# Patient Record
Sex: FEMALE
[De-identification: names, ages, dates, MRNs, and addresses within clinical notes are randomized per-mention and may not be internally consistent; named-entity substitution may affect disease eponyms.]

---

## 2019-12-23 ENCOUNTER — HOSPITAL ENCOUNTER (EMERGENCY)
Dept: HOSPITAL 43 - DL.ED | Age: 29
Discharge: HOME | End: 2019-12-23
Payer: SELF-PAY

## 2019-12-23 DIAGNOSIS — J20.9: Primary | ICD-10-CM

## 2019-12-23 PROCEDURE — 71046 X-RAY EXAM CHEST 2 VIEWS: CPT

## 2019-12-23 PROCEDURE — 87081 CULTURE SCREEN ONLY: CPT

## 2019-12-23 PROCEDURE — 99284 EMERGENCY DEPT VISIT MOD MDM: CPT

## 2019-12-23 PROCEDURE — 96372 THER/PROPH/DIAG INJ SC/IM: CPT

## 2019-12-23 PROCEDURE — 87430 STREP A AG IA: CPT

## 2019-12-23 PROCEDURE — 87804 INFLUENZA ASSAY W/OPTIC: CPT

## 2019-12-23 NOTE — EDM.PDOC
ED HPI GENERAL MEDICAL PROBLEM





- General


Chief Complaint: ENT Problem


Stated Complaint: COUGHING PAST COUPLE DAYS, HURTS RIBS


Time Seen by Provider: 19 07:02


Source of Information: Reports: Patient


History Limitations: Reports: No Limitations





- History of Present Illness


INITIAL COMMENTS - FREE TEXT/NARRATIVE: 





sick with cough past month.


Treatments PTA: Reports: Other (see below)


Other Treatments PTA: Aleve


  ** Upper Back


Pain Score (Numeric/FACES): 10





- Related Data


 Allergies











Allergy/AdvReac Type Severity Reaction Status Date / Time


 


No Known Allergies Allergy   Verified 19 06:09











Home Meds: 


 Home Meds





. [No Known Home Meds]  19 [History]











Past Medical History


HEENT History: Reports: None


Cardiovascular History: Reports: None


Respiratory History: Reports: Bronchitis, Recurrent


Gastrointestinal History: Reports: None


Genitourinary History: Reports: None


OB/GYN History: Reports: Pregnancy


Musculoskeletal History: Reports: None


Neurological History: Reports: None


Psychiatric History: Reports: None


Endocrine/Metabolic History: Reports: None


Hematologic History: Reports: None


Immunologic History: Reports: None


Oncologic (Cancer) History: Reports: None


Dermatologic History: Reports: None





- Infectious Disease History


Infectious Disease History: Reports: None





- Past Surgical History


Head Surgeries/Procedures: Reports: None


HEENT Surgical History: Reports: None


Cardiovascular Surgical History: Reports: None


Respiratory Surgical History: Reports: None


GI Surgical History: Reports: None


Female  Surgical History: Reports:  Section


Endocrine Surgical History: Reports: None


Musculoskeletal Surgical History: Reports: None


Oncologic Surgical History: Reports: None





Social & Family History





- Family History


Family Medical History: Noncontributory





- Tobacco Use


Smoking Status *Q: Never Smoker


Second Hand Smoke Exposure: No





- Caffeine Use


Caffeine Use: Reports: Soda





- Recreational Drug Use


Recreational Drug Use: No





ED ROS ENT





- Review of Systems


Review Of Systems: Comprehensive ROS is negative, except as noted in HPI.





ED EXAM, ENT





- Physical Exam


Exam: See Below


Exam Limited By: No Limitations


General Appearance: Alert, WD/WN, Mild Distress, Other (cough spasms)


Ears: Hearing Grossly Normal


Nose: Normal Inspection


Mouth/Throat: Pharyngeal Erythema


Head: Atraumatic


Neck: Non-Tender, Full Range of Motion


Respiratory/Chest: No Respiratory Distress, No Accessory Muscle Use, Rhonchi.  

No: Decreased Breath Sounds


Cardiovascular: Regular Rate, Rhythm


GI/Abdominal: Soft, Non-Tender


Neurological: Alert, Oriented, Normal Cognition, Normal Gait, No Motor/Sensory 

Deficits


Psychiatric: Normal Affect, Normal Mood


Skin: Warm, Dry, Normal Color


Lymphatic: No Adenopathy





Course





- Vital Signs


Last Recorded V/S: 





 Last Vital Signs











Temp  35.9 C   19 06:08


 


Pulse  92   19 06:08


 


Resp  18   19 06:08


 


BP  111/67   19 06:08


 


Pulse Ox  100   19 06:08














- Orders/Labs/Meds


Orders: 





 Active Orders 24 hr











 Category Date Time Status


 


 RT Aerosol Therapy [RC] ASDIRECTED Care  19 06:12 Active


 


 Chest 2V [CR] Urgent Exams  19 06:12 Taken


 


 CULTURE STREP A CONFIRMATION [RM] Stat Lab  19 06:05 Results


 


 STREP SCRN A RAPID W CULT CONF [RM] Stat Lab  19 06:05 Results


 


 Ketorolac [Toradol] Med  19 07:02 Once





 30 mg IM ONETIME ONE   











Meds: 





Medications














Discontinued Medications














Generic Name Dose Route Start Last Admin





  Trade Name Freq  PRN Reason Stop Dose Admin


 


Albuterol/Ipratropium  3 ml  19 06:11  19 06:22





  Duoneb 3.0-0.5 Mg/3 Ml  NEB  19 06:12  3 ml





  ONETIME ONE   Administration





     





     





     





     














- Re-Assessments/Exams


Free Text/Narrative Re-Assessment/Exam: 





19 07:04


results discussed with pt.





Departure





- Departure


Time of Disposition: 07:06


Disposition: Home, Self-Care 01


Condition: Good


Clinical Impression: 


 Acute bronchitis with bronchospasm








- Discharge Information


Instructions:  Acute Bronchitis, Adult, Easy-to-Read


Additional Instructions: 


1) don't sleep flat at night


2) drink lots of liquids


3) use humidifier at bedtime


4) follow up at clinic





rx given;


medrol dospak


albuterol 2.5mg solution tid prn


phenergan codeine syrup qid prn x 4oz





Sepsis Event Note





- Evaluation


Sepsis Screening Result: No Definite Risk





- Focused Exam


Vital Signs: 





 Vital Signs











  Temp Pulse Resp BP Pulse Ox


 


 19 06:08  35.9 C  92  18  111/67  100











Date Exam was Performed: 19


Time Exam was Performed: 07:02





- My Orders


Last 24 Hours: 





My Active Orders





19 06:05


CULTURE STREP A CONFIRMATION [RM] Stat 


STREP SCRN A RAPID W CULT CONF [RM] Stat 





19 06:12


RT Aerosol Therapy [RC] ASDIRECTED 


Chest 2V [CR] Urgent 





19 07:02


Ketorolac [Toradol]   30 mg IM ONETIME ONE 














- Assessment/Plan


Last 24 Hours: 





My Active Orders





19 06:05


CULTURE STREP A CONFIRMATION [RM] Stat 


STREP SCRN A RAPID W CULT CONF [RM] Stat 





19 06:12


RT Aerosol Therapy [RC] ASDIRECTED 


Chest 2V [CR] Urgent 





19 07:02


Ketorolac [Toradol]   30 mg IM ONETIME ONE

## 2020-01-20 NOTE — EDM.PDOC
<Kei Vicente - Last Filed: 20 06:38>





ED HPI GENERAL MEDICAL PROBLEM





- General


Chief Complaint: Genitourinary Problem


Stated Complaint: STRONG STOMACH PAIN


Time Seen by Provider: 20 06:38


Source of Information: Reports: Patient


History Limitations: Reports: No Limitations





- History of Present Illness


INITIAL COMMENTS - FREE TEXT/NARRATIVE: 





c/o urgency & frequency. also concerned pregnancy which requires serum


  ** Perineal Area


Pain Score (Numeric/FACES): 4





- Related Data


 Allergies











Allergy/AdvReac Type Severity Reaction Status Date / Time


 


No Known Allergies Allergy   Verified 20 06:13











Home Meds: 


 Home Meds





. [No Known Home Meds]  19 [History]











Past Medical History


HEENT History: Reports: None


Cardiovascular History: Reports: None


Respiratory History: Reports: Bronchitis, Recurrent


Gastrointestinal History: Reports: None


Genitourinary History: Reports: None


OB/GYN History: Reports: Pregnancy


Musculoskeletal History: Reports: None


Neurological History: Reports: None


Psychiatric History: Reports: None


Endocrine/Metabolic History: Reports: None


Hematologic History: Reports: None


Immunologic History: Reports: None


Oncologic (Cancer) History: Reports: None


Dermatologic History: Reports: None





- Infectious Disease History


Infectious Disease History: Reports: None





- Past Surgical History


Head Surgeries/Procedures: Reports: None


HEENT Surgical History: Reports: None


Cardiovascular Surgical History: Reports: None


Respiratory Surgical History: Reports: None


GI Surgical History: Reports: None


Female  Surgical History: Reports:  Section


Endocrine Surgical History: Reports: None


Musculoskeletal Surgical History: Reports: None


Oncologic Surgical History: Reports: None





Social & Family History





- Family History


Family Medical History: Noncontributory





- Tobacco Use


Smoking Status *Q: Never Smoker


Second Hand Smoke Exposure: No





- Caffeine Use


Caffeine Use: Reports: Coffee, Soda





- Recreational Drug Use


Recreational Drug Use: No





ED ROS GENERAL





- Review of Systems


Review Of Systems: Comprehensive ROS is negative, except as noted in HPI.





ED EXAM, RENAL/





- Physical Exam


Exam: See Below


Exam Limited By: No Limitations


General Appearance: Alert, WD/WN, No Apparent Distress


Ears: Hearing Grossly Normal


Throat/Mouth: Normal Voice, No Airway Compromise


Head: Atraumatic


Neck: Non-Tender, Full Range of Motion


Respiratory/Chest: No Respiratory Distress


Cardiovascular: Regular Rate, Rhythm


GI/Abdominal: Soft, Non-Tender


Neurological: Alert, Oriented, Normal Cognition, Normal Gait, No Motor/Sensory 

Deficits


Psychiatric: Normal Affect, Normal Mood


Skin Exam: Warm, Dry, Normal Color


Lymphatic: No Adenopathy





Course





- Vital Signs


Last Recorded V/S: 





 Last Vital Signs











Temp  36.1 C   20 06:13


 


Pulse  86   20 06:13


 


Resp  16   20 06:13


 


BP  137/93 H  20 06:13


 


Pulse Ox  100   20 06:13














- Orders/Labs/Meds


Labs: 





 Laboratory Tests











  20 Range/Units





  06:15 06:15 06:56 


 


HCG, Qual    Negative  


 


Urine Color  Light yellow    (YELLOW)  


 


Urine Appearance  Clear    (CLEAR)  


 


Urine pH  5.5    (5.0-9.0)  


 


Ur Specific Gravity  >= 1.030    (1.005-1.030)  


 


Urine Protein  Negative    (NEGATIVE)  


 


Urine Glucose (UA)  Negative    (NEGATIVE)  


 


Urine Ketones  Negative    (NEGATIVE)  


 


Urine Occult Blood  Negative    (NEGATIVE)  


 


Urine Nitrite  Negative    (NEGATIVE)  


 


Urine Bilirubin  Negative    (NEGATIVE)  


 


Urine Urobilinogen  0.2    (0.2-1.0)  mg/dL


 


Ur Leukocyte Esterase  Negative    (NEGATIVE)  


 


Urine HCG, Qual   Negative   














Departure





- Departure


Disposition: Home, Self-Care 01


Clinical Impression: 


 Negative pregnancy test, Urinary frequency








- Discharge Information


Forms:  ED Department Discharge


Care Plan Goals: 


The patient was advised of the examination and lab results during the visit. 

The patient was encouraged to increase her oral fluid intake. If the patient 

has any additional symptoms or concerns, the patient should visit her primary 

care facility or return to the emergency department. 





Sepsis Event Note





- Evaluation


Sepsis Screening Result: No Definite Risk





- Focused Exam


Vital Signs: 





 Vital Signs











  Temp Pulse Resp BP Pulse Ox


 


 20 06:13  36.1 C  86  16  137/93 H  100











Date Exam was Performed: 20


Time Exam was Performed: 06:38





<Tim Eller - Last Filed: 20 07:30>





Departure





- Departure


Time of Disposition: 07:28


Condition: Good





- Discharge Information


*PRESCRIPTION DRUG MONITORING PROGRAM REVIEWED*: Not Applicable


*COPY OF PRESCRIPTION DRUG MONITORING REPORT IN PATIENT [facility]: Not Applicable





Sepsis Event Note





- Focused Exam


Date Exam was Performed: 20


Time Exam was Performed: 07:28

## 2020-02-16 NOTE — EDM.PDOC
<DarricksharitaEvi huber Erggie - Last Filed: 20 06:08>





ED HPI GENERAL MEDICAL PROBLEM





- General


Stated Complaint: HEADACHE


Time Seen by Provider: 20 06:05


Source of Information: Reports: Patient, RN Notes Reviewed





- Related Data


 Allergies











Allergy/AdvReac Type Severity Reaction Status Date / Time


 


No Known Allergies Allergy   Verified 20 06:11











Home Meds: 


 Home Meds





. [No Known Home Meds]  19 [History]











Past Medical History


HEENT History: Reports: None


Cardiovascular History: Reports: None


Respiratory History: Reports: Bronchitis, Recurrent


Gastrointestinal History: Reports: None


Genitourinary History: Reports: None


OB/GYN History: Reports: Pregnancy


Musculoskeletal History: Reports: None


Neurological History: Reports: None


Psychiatric History: Reports: None


Endocrine/Metabolic History: Reports: None


Hematologic History: Reports: None


Immunologic History: Reports: None


Oncologic (Cancer) History: Reports: None


Dermatologic History: Reports: None





- Infectious Disease History


Infectious Disease History: Reports: None





- Past Surgical History


Head Surgeries/Procedures: Reports: None


HEENT Surgical History: Reports: None


Cardiovascular Surgical History: Reports: None


Respiratory Surgical History: Reports: None


GI Surgical History: Reports: None


Female  Surgical History: Reports:  Section


Endocrine Surgical History: Reports: None


Musculoskeletal Surgical History: Reports: None


Oncologic Surgical History: Reports: None





Social & Family History





- Family History


Family Medical History: Noncontributory





- Caffeine Use


Caffeine Use: Reports: Coffee, Soda





Course





- Vital Signs


Last Recorded V/S: 





 Last Vital Signs











Temp  97.3 F   20 07:07


 


Pulse  76   20 07:07


 


Resp  18   20 07:07


 


BP  117/73   20 07:07


 


Pulse Ox  100   20 07:07














- Orders/Labs/Meds


Labs: 





 Laboratory Tests











  20 Range/Units





  06:23 06:23 06:23 


 


HCG, Quant     (0-25)  mIU/ml


 


Beta HCG, Quant     


 


Urine Color  Yellow    (YELLOW)  


 


Urine Appearance  Clear    (CLEAR)  


 


Urine pH  7.0    (5.0-9.0)  


 


Ur Specific Gravity  1.010    (1.005-1.030)  


 


Urine Protein  Negative    (NEGATIVE)  


 


Urine Glucose (UA)  Negative    (NEGATIVE)  


 


Urine Ketones  Negative    (NEGATIVE)  


 


Urine Occult Blood  Negative    (NEGATIVE)  


 


Urine Nitrite  Negative    (NEGATIVE)  


 


Urine Bilirubin  Negative    (NEGATIVE)  


 


Urine Urobilinogen  0.2    (0.2-1.0)  mg/dL


 


Ur Leukocyte Esterase  Negative    (NEGATIVE)  


 


Urine HCG, Qual   Negative   


 


Urine Opiates Screen    Negative  (NEGATIVE)  


 


Ur Oxycodone Screen    Negative  (NEGATIVE)  


 


Urine Methadone Screen    Negative  (NEGATIVE)  


 


Ur Barbiturates Screen    Negative  (NEGATIVE)  


 


U Tricyclic Antidepress    Negative  (NEGATIVE)  


 


Ur Phencyclidine Scrn    Negative  (NEGATIVE)  


 


Ur Amphetamine Screen    Negative  (NEGATIVE)  


 


U Methamphetamines Scrn    Negative  (NEGATIVE)  


 


Urine MDMA Screen    Negative  (NEGATIVE)  


 


U Benzodiazepines Scrn    Negative  (NEGATIVE)  


 


Urine Cocaine Screen    Negative  (NEGATIVE)  


 


U Marijuana (THC) Screen    Negative  (NEGATIVE)  














  20 Range/Units





  07:00 


 


HCG, Quant  65 H  (0-25)  mIU/ml


 


Beta HCG, Quant  TNP  


 


Urine Color   (YELLOW)  


 


Urine Appearance   (CLEAR)  


 


Urine pH   (5.0-9.0)  


 


Ur Specific Gravity   (1.005-1.030)  


 


Urine Protein   (NEGATIVE)  


 


Urine Glucose (UA)   (NEGATIVE)  


 


Urine Ketones   (NEGATIVE)  


 


Urine Occult Blood   (NEGATIVE)  


 


Urine Nitrite   (NEGATIVE)  


 


Urine Bilirubin   (NEGATIVE)  


 


Urine Urobilinogen   (0.2-1.0)  mg/dL


 


Ur Leukocyte Esterase   (NEGATIVE)  


 


Urine HCG, Qual   


 


Urine Opiates Screen   (NEGATIVE)  


 


Ur Oxycodone Screen   (NEGATIVE)  


 


Urine Methadone Screen   (NEGATIVE)  


 


Ur Barbiturates Screen   (NEGATIVE)  


 


U Tricyclic Antidepress   (NEGATIVE)  


 


Ur Phencyclidine Scrn   (NEGATIVE)  


 


Ur Amphetamine Screen   (NEGATIVE)  


 


U Methamphetamines Scrn   (NEGATIVE)  


 


Urine MDMA Screen   (NEGATIVE)  


 


U Benzodiazepines Scrn   (NEGATIVE)  


 


Urine Cocaine Screen   (NEGATIVE)  


 


U Marijuana (THC) Screen   (NEGATIVE)  














Departure





- Departure


Disposition: Home, Self-Care 01


Clinical Impression: 


 Pregnancy test positive, Pregnancy confirmed by positive blood test








- Discharge Information


Instructions:  Human Chorionic Gonadotropin Test, Pregnancy Test Information


Additional Instructions: 


Follow up with PCP for prenatal care.





Sepsis Event Note





- Focused Exam


Vital Signs: 





 Vital Signs











  Temp Pulse Resp BP Pulse Ox


 


 20 07:07  97.3 F  76  18  117/73  100











Date Exam was Performed: 20


Time Exam was Performed: 06:08





<Bethany Denton - Last Filed: 20 08:09>





ED HPI GENERAL MEDICAL PROBLEM





- General


History Limitations: Reports: No Limitations





- History of Present Illness


INITIAL COMMENTS - FREE TEXT/NARRATIVE: 


29 year old female who present to the ER for confirmation of a pregnancy. 

Patient reports her LMP was 2020. She states she had five pregnancy test 

at home prior to ER visit and four of them were positive. Test were present in 

the room with this patient. She is a [id number]. She is  and in a 

monogamous relationship. She denies any N/V. Reports a headache for the past 

three days which she has been medicating with Tylenol, ibuprofen and Aleve with 

relief. She states she has been working and just finish her last night shift 

before this visit. She denies any vaginal bleeding at this time. 





  ** Frontal Headache


Pain Score (Numeric/FACES): 6





ED ROS GENERAL





- Review of Systems


Review Of Systems: Comprehensive ROS is negative, except as noted in HPI.





- Physical Exam


Exam: See Below


Exam Limited By: No Limitations


General Appearance: Alert, No Apparent Distress


Eye Exam: Bilateral Eye: Normal Inspection


Ears: Normal External Exam, Normal Canal, Hearing Grossly Normal, Normal TMs


Nose: Normal Inspection, Normal Mucosa, No Blood


Throat/Mouth: Normal Inspection, Normal Lips, Normal Teeth, Normal Gums, Normal 

Oropharynx, Normal Voice, No Airway Compromise


Head Exam: Atraumatic, Normocephalic


Neck: Normal Inspection, Supple, Non-Tender, Full Range of Motion


Respiratory/Chest: No Respiratory Distress, Lungs Clear, Normal Breath Sounds, 

No Accessory Muscle Use, Chest Non-Tender


Cardiovascular: Normal Peripheral Pulses, Regular Rate, Rhythm, No Edema, No 

Gallop, No JVD, No Murmur, No Rub


GI/Abdominal: Normal Bowel Sounds, Soft


Neuro Exam (Abbreviated): Alert, Oriented, CN II-XII Intact, Normal Cognition


Back Exam: Normal Inspection, Full Range of Motion, NT


Extremities: Normal Inspection


Psychiatric: Normal Affect, Normal Mood


Skin Exam: Warm, Dry, Intact, Normal Color, No Rash





Course





- Re-Assessments/Exams


Free Text/Narrative Re-Assessment/Exam: 


Review lab results with patient. Encouraged follow up with PCP or OBGYN for 

prenatal care.











Departure





- Departure


Time of Disposition: 07:57


Condition: Good





Sepsis Event Note





- Focused Exam


Date Exam was Performed: 20


Time Exam was Performed: 07:56

## 2020-03-08 NOTE — EDM.PDOC
ED HPI GENERAL MEDICAL PROBLEM





- General


Chief Complaint: OB/GYN Problem


Stated Complaint: NAUSEA


Time Seen by Provider: 20 10:31


Source of Information: Reports: Patient


History Limitations: Reports: No Limitations





- History of Present Illness


INITIAL COMMENTS - FREE TEXT/NARRATIVE: 





4 days h/o N/V. currently pregnant @ 6 weeks and had this with prior 

pregnancies. this is #6. saw PMD got reglan but not seem to be working well.


  ** Head


Pain Score (Numeric/FACES): 6





- Related Data


 Allergies











Allergy/AdvReac Type Severity Reaction Status Date / Time


 


No Known Allergies Allergy   Verified 20 06:11











Home Meds: 


 Home Meds





. [No Known Home Meds]  19 [History]











Past Medical History


HEENT History: Reports: None


Cardiovascular History: Reports: None


Respiratory History: Reports: Bronchitis, Recurrent


Gastrointestinal History: Reports: None


Genitourinary History: Reports: None


OB/GYN History: Reports: Pregnancy, Spontaneous 


Musculoskeletal History: Reports: None


Neurological History: Reports: None


Psychiatric History: Reports: None


Endocrine/Metabolic History: Reports: None


Hematologic History: Reports: None


Immunologic History: Reports: None


Oncologic (Cancer) History: Reports: None


Dermatologic History: Reports: None





- Infectious Disease History


Infectious Disease History: Reports: None





- Past Surgical History


Head Surgeries/Procedures: Reports: None


HEENT Surgical History: Reports: None


Cardiovascular Surgical History: Reports: None


Respiratory Surgical History: Reports: None


GI Surgical History: Reports: None


Female  Surgical History: Reports:  Section


Endocrine Surgical History: Reports: None


Musculoskeletal Surgical History: Reports: None


Oncologic Surgical History: Reports: None





Social & Family History





- Family History


Family Medical History: Noncontributory





- Tobacco Use


Smoking Status *Q: Never Smoker


Second Hand Smoke Exposure: No





- Caffeine Use


Caffeine Use: Reports: Coffee, Energy Drinks, Soda, Tea





- Recreational Drug Use


Recreational Drug Use: No





ED ROS GENERAL





- Review of Systems


Review Of Systems: Comprehensive ROS is negative, except as noted in HPI.





ED EXAM, GI/ABD





- Physical Exam


Exam: See Below


Exam Limited By: No Limitations


General Appearance: Alert, WD/WN, Mild Distress, Other (nausous).  No: Active 

Emesis


Ears: Hearing Grossly Normal


Throat/Mouth: Normal Voice, No Airway Compromise


Head: Atraumatic


Neck: Non-Tender, Full Range of Motion


Respiratory/Chest: No Respiratory Distress


Cardiovascular: Regular Rate, Rhythm


GI/Abdominal Exam: Soft, Non-Tender.  No: Distended, Guarding, Rigid, Rebound, 

Tender


Neurological: Alert, Oriented, Normal Cognition, Normal Gait, No Motor/Sensory 

Deficits


Psychiatric: Flat Affect


Skin Exam: Warm, Dry, Normal Color


Lymphatic: No Adenopathy





Course





- Vital Signs


Last Recorded V/S: 


 Last Vital Signs











Temp  36.6 C   20 09:46


 


Pulse  81   20 09:46


 


Resp  18   20 09:46


 


BP  123/77   20 09:46


 


Pulse Ox  100   20 09:46














- Orders/Labs/Meds


Labs: 


 Laboratory Tests











  20 Range/Units





  10:14 10:14 


 


WBC  8.8   (5.0-10.0)  10^3/uL


 


RBC  4.02 L   (4.2-5.4)  10^6/uL


 


Hgb  11.3 L D   (12.0-16.0)  g/dL


 


Hct  34.8 L   (37.0-47.0)  %


 


MCV  86.6   ()  fL


 


MCH  28.1   (27.0-34.0)  pg


 


MCHC  32.5 L   (33.0-35.0)  g/dL


 


Plt Count  272   (150-450)  10^3/uL


 


Neut % (Auto)  65.8   (42.2-75.2)  %


 


Lymph % (Auto)  21.9   (20.5-50.1)  %


 


Mono % (Auto)  8.9 H   (2-8)  %


 


Eos % (Auto)  3.2 H   (1.0-3.0)  %


 


Baso % (Auto)  0.2   (0.0-1.0)  %


 


Sodium   132 L  (135-145)  mmol/L


 


Potassium   3.6  (3.6-5.0)  mmol/L


 


Chloride   101  (101-111)  mmol/L


 


Carbon Dioxide   25.0  (21.0-31.0)  mmol/L


 


Anion Gap   9.6  


 


BUN   10  (7-18)  mg/dL


 


Creatinine   0.5 L  (0.6-1.3)  mg/dL


 


Est Cr Clr Drug Dosing   176.62  mL/min


 


Estimated GFR (MDRD)   > 60  


 


BUN/Creatinine Ratio   20.00  


 


Glucose   87  ()  mg/dL


 


Calcium   8.7  (8.4-10.2)  mg/dl


 


Total Bilirubin   0.3  (0.2-1.0)  mg/dL


 


AST   17  (10-42)  IU/L


 


ALT   24  (10-60)  IU/L


 


Alkaline Phosphatase   52  ()  IU/L


 


Total Protein   7.1  (6.7-8.2)  g/dl


 


Albumin   4.0  (3.2-5.5)  g/dl


 


Globulin   3.1  


 


Albumin/Globulin Ratio   1.29  











Meds: 


Medications














Discontinued Medications














Generic Name Dose Route Start Last Admin





  Trade Name Freq  PRN Reason Stop Dose Admin


 


Acetaminophen  1,000 mg  20 10:28  20 10:32





  Tylenol Extra Strength  PO  20 10:29  1,000 mg





  ONETIME ONE   Administration





     





     





     





     


 


Sodium Chloride  1,000 mls @ 999 mls/hr  20 10:08  20 10:21





  Normal Saline  IV  20 11:08  999 mls/hr





  .BOLUS ONE   Administration





     





     





     





     


 


Ondansetron HCl  4 mg  20 10:08  20 10:21





  Zofran  IVPUSH  20 10:09  4 mg





  ONETIME ONE   Administration





     





     





     





     














- Re-Assessments/Exams


Free Text/Narrative Re-Assessment/Exam: 





20 11:10


results discussed with pt who is feeling better s/p zofran. states works better 

than reglan.





Departure





- Departure


Time of Disposition: 11:12


Disposition: Home, Self-Care 01


Condition: Good


Clinical Impression: 


 Hyperemesis gravidarum








- Discharge Information


Instructions:  Eating Plan for Hyperemesis Gravidarum


Forms:  ED Department Discharge


Additional Instructions: 


1) continue fluids and rest


2) follow up at clinic


3) recheck as needed





rx given;


zofran 4mg ODT daily prn x6





Sepsis Event Note





- Evaluation


Sepsis Screening Result: No Definite Risk





- Focused Exam


Vital Signs: 


 Vital Signs











  Temp Pulse Resp BP Pulse Ox


 


 20 09:46  36.6 C  81  18  123/77  100











Date Exam was Performed: 20


Time Exam was Performed: 11:10

## 2020-07-10 NOTE — EDM.PDOC
Scribed by Lalita Courtney 07/10/20 1518 for Bethany Denton NP





ED HPI GENERAL MEDICAL PROBLEM





- General


Chief Complaint: Headache


Stated Complaint: MIGRAINE-25 WKS PREGNANT


Time Seen by Provider: 07/10/20 14:50


Source of Information: Reports: Patient, RN, RN Notes Reviewed


History Limitations: Reports: No Limitations





- History of Present Illness


INITIAL COMMENTS - FREE TEXT/NARRATIVE: 


A 29-year-old female 25 week pregnant who presented to ER with complaints of 

frontal headache x8 hours. She reports waking up with a headache, went back to 

sleep and woke up an hour later with the same headache. She would like to rule 

out pre-eclampsia. She reports a lump on the right cheek, which was due for an 

MRI 2 weeks ago but she never went to the appointment with neurology. She 

reports the headache 5/10 and has not tried anything for discomfort. No 

shortness of breath, chest pain, or palpitations. She denies any dizziness, 

photophobia, recent head traum, injury or fall. 


Onset: Today


Severity: Moderate


  ** Headache


Pain Score (Numeric/FACES): 5





- Related Data


                                    Allergies











Allergy/AdvReac Type Severity Reaction Status Date / Time


 


No Known Allergies Allergy   Verified 07/10/20 14:28











Home Meds: 


                                    Home Meds





Sertraline [Zoloft] 25 mg PO DAILY 07/10/20 [History]











Past Medical History


HEENT History: Reports: None


Cardiovascular History: Reports: None


Respiratory History: Reports: Bronchitis, Recurrent


Gastrointestinal History: Reports: None


Genitourinary History: Reports: None


OB/GYN History: Reports: Pregnancy, Spontaneous 


Musculoskeletal History: Reports: None


Neurological History: Reports: None


Psychiatric History: Reports: Depression


Endocrine/Metabolic History: Reports: None


Hematologic History: Reports: None


Immunologic History: Reports: None


Oncologic (Cancer) History: Reports: None


Dermatologic History: Reports: None





- Infectious Disease History


Infectious Disease History: Reports: None





- Past Surgical History


Head Surgeries/Procedures: Reports: None


HEENT Surgical History: Reports: None


Cardiovascular Surgical History: Reports: None


Respiratory Surgical History: Reports: None


GI Surgical History: Reports: None


Female  Surgical History: Reports:  Section


Endocrine Surgical History: Reports: None


Musculoskeletal Surgical History: Reports: None


Oncologic Surgical History: Reports: None





Social & Family History





- Family History


Family Medical History: Noncontributory





- Tobacco Use


Smoking Status *Q: Never Smoker





- Caffeine Use


Caffeine Use: Reports: None





- Recreational Drug Use


Recreational Drug Use: No





ED ROS GENERAL





- Review of Systems


Review Of Systems: Comprehensive ROS is negative, except as noted in HPI.





- Physical Exam


Exam: See Below


Exam Limited By: No Limitations


General Appearance: Anxious


Eye Exam: Bilateral Eye: EOMI, Normal Inspection, PERRL


Ears: Normal External Exam, Normal Canal, Hearing Grossly Normal, Normal TMs


Nose: Normal Inspection, Normal Mucosa, No Blood


Throat/Mouth: Normal Inspection, Normal Lips, Normal Teeth, Normal Gums, Normal 

Oropharynx, Normal Voice, No Airway Compromise


Head Exam: Atraumatic, Normocephalic


Neck: Normal Inspection, Supple, Non-Tender, Full Range of Motion


Respiratory/Chest: No Respiratory Distress, Lungs Clear, Normal Breath Sounds, 

No Accessory Muscle Use, Chest Non-Tender


Cardiovascular: Normal Peripheral Pulses, Regular Rate, Rhythm, No Edema, No 

Gallop, No JVD, No Murmur, No Rub


 (Female) Exam: Deferred


Rectal (Female) Exam: Deferred


Neuro Exam (Abbreviated): Alert, Oriented, CN II-XII Intact, Normal Cognition, 

Normal Gait


Back Exam: Normal Inspection, Full Range of Motion, NT


Psychiatric: Anxious


Skin Exam: Warm, Dry, Intact, Normal Color, No Rash





Course





- Vital Signs


Last Recorded V/S: 


                                Last Vital Signs











Temp  97.7 F   07/10/20 14:22


 


Pulse  89   07/10/20 14:22


 


Resp  18   07/10/20 14:22


 


BP  116/72   07/10/20 14:22


 


Pulse Ox  99   07/10/20 14:22














- Re-Assessments/Exams


Free Text/Narrative Re-Assessment/Exam: 


Reviewed exam and findings with patient. Discussed ordering urine, and some labs

 and also administered Tylenol and IV fluids for discomfort. Patient agreed to 

plan. She walked out AMA. 











Departure





- Departure


Time of Disposition: 15:15


Disposition: Against Medical Advice 07


Clinical Impression: 


 Headache








- Discharge Information


Referrals: 


Danay Srinivasan MD [Primary Care Provider] - 


Forms:  ED Department Discharge





Sepsis Event Note (ED)





- Evaluation


Sepsis Screening Result: No Definite Risk





- Focused Exam


Vital Signs: 


                                   Vital Signs











  Temp Pulse Resp BP Pulse Ox


 


 07/10/20 14:22  97.7 F  89  18  116/72  99














I have read and agree with the documentation that has been completed regarding 

this visit. By signing this record, I attest that the documentation was 

completed in my physical presence and is an accurate record of the encounter.

## 2021-09-30 NOTE — CT
PROCEDURE INFORMATION: 

Exam: CT Abdomen And Pelvis With Contrast 

Exam date and time: 9/30/2021 5:02 PM 

Age: 31 years old 

Clinical indication: Abdominal pain; Localized; Right upper quadrant (ruq); 

Additional info: Ruq pain 



TECHNIQUE: 

Imaging protocol: Computed tomography of the abdomen and pelvis with contrast. 

Sagittal and coronal reformatted images were created and reviewed. 

Radiation optimization: All CT scans at this facility use at least one of these 

dose optimization techniques: automated exposure control; mA and/or kV 

adjustment per patient size (includes targeted exams where dose is matched to 

clinical indication); or iterative reconstruction. 

Contrast material: ISOVUE 300; Contrast volume: 75 ml; Contrast route: 

INTRAVENOUS (IV);  



COMPARISON: 

No relevant prior studies available. 



FINDINGS: 

Lungs: Visualized lungs are clear. 

Pleural spaces: No pleural effusion. 

Heart: Visualized portions of the heart are unremarkable. 



Liver: The liver is unremarkable. 

Gallbladder and bile ducts: The gallbladder is unremarkable. No biliary ductal 

dilatation. 

Pancreas: The pancreas is unremarkable. No pancreatic ductal dilatation. 

Spleen: The spleen is unremarkable. 

Adrenal glands: The right and left adrenal glands are unremarkable. 

Kidneys and ureters: The right kidney is unremarkable. Nonobstructing stone in 

the left kidney measuring 2.6 mm (series 2, image 30). The right and left 

ureters are unremarkable. 

Stomach and bowel: No obstruction. No mucosal thickening. 

Appendix: The appendix is visualized and is unremarkable. No findings to 

suggest acute appendicitis. 



Intraperitoneal space: No free intraperitoneal air. No ascites. No loculated 

fluid collections to suggest an abscess. 

Vasculature: No evidence for aortic aneurysm or aortic dissection. Hepatic 

veins, portal veins, splenic vein, and SMV are patent. 

Lymph nodes: No lymphadenopathy. 

Urinary bladder: The bladder is incompletely filled, which can limit 

evaluation. No focal abnormality in the bladder however. 

Reproductive: There is an arcuate uterus. Dominant follicle in the right ovary 

measuring 1.3 x 1.6 cm (series 2, image 76). Multiple subcentimeter follicles 

in both right and left ovaries. 

Bones/joints: Bone islands in the right femoral head and right acetabulum. 

Soft tissues: No acute abnormality in the extra-abdominal soft tissues. 



IMPRESSION: 

1. No acute abnormality in the abdomen or pelvis. 

2. Nonobstructing left renal stone. 

3. Dominant follicle in the right ovary. 

4. Incidental/nonacute findings are listed in the report.

## 2021-09-30 NOTE — EDM.PDOC
Scribed by Lalita Courtney 21 [id number] for Ivan Olivarez MD





ED HPI GENERAL MEDICAL PROBLEM





- General


Chief Complaint: Abdominal Pain


Stated Complaint: PASSING GALLSTONES


Time Seen by Provider: 21 15:35


Source of Information: Reports: Patient, RN, RN Notes Reviewed


History Limitations: Reports: No Limitations





- History of Present Illness


INITIAL COMMENTS - FREE TEXT/NARRATIVE: 


Patient presents to ED by POV stating she has had RUQ pain for the past 3 days 

that radiates around to her back to her spine.  Has felt constipated the past 

couple days and today passed some mucous black stool which she took a picture of

and showed her PCP, Gogo Michelle in [place].  Patient states she was told to 

come to the ER here since we have ultrasound to check gallbladder.  States this 

A.M. she had a hard time emptying her bladder but voiding ok now.  No fever or 

chills.


Onset: Gradual


Duration: Constant


Location: Reports: Abdomen


Quality: Reports: Ache


Severity: Moderate


Improves with: Reports: None


Worsens with: Reports: None


Associated Symptoms: Reports: No Other Symptoms


  ** Right Upper Abdomen


Pain Score (Numeric/FACES): 6





- Related Data


                                    Allergies











Allergy/AdvReac Type Severity Reaction Status Date / Time


 


No Known Allergies Allergy   Verified 21 15:51











Home Meds: 


                                    Home Meds





. [No Known Home Meds]  21 [History]











Past Medical History


HEENT History: Reports: None


Cardiovascular History: Reports: None


Respiratory History: Reports: Bronchitis, Recurrent


Gastrointestinal History: Reports: None


Genitourinary History: Reports: None


OB/GYN History: Reports: Pregnancy, Spontaneous 


Musculoskeletal History: Reports: None


Neurological History: Reports: None


Psychiatric History: Reports: Depression


Endocrine/Metabolic History: Reports: None


Hematologic History: Reports: None


Immunologic History: Reports: None


Oncologic (Cancer) History: Reports: None


Dermatologic History: Reports: None





- Infectious Disease History


Infectious Disease History: Reports: None





- Past Surgical History


Head Surgeries/Procedures: Reports: None


HEENT Surgical History: Reports: None


Cardiovascular Surgical History: Reports: None


Respiratory Surgical History: Reports: None


GI Surgical History: Reports: None


Female  Surgical History: Reports:  Section


Endocrine Surgical History: Reports: None


Musculoskeletal Surgical History: Reports: None


Oncologic Surgical History: Reports: None





Social & Family History





- Family History


Family Medical History: No Pertinent Family History





- Caffeine Use


Caffeine Use: Reports: None





ED ROS GENERAL





- Review of Systems


Review Of Systems: Comprehensive ROS is negative, except as noted in HPI.





ED EXAM, GENERAL





- Physical Exam


Exam: See Below


Exam Limited By: No Limitations


General Appearance: Alert, WD/WN, No Apparent Distress


Eye Exam: Bilateral Eye: Normal Inspection


Nose: Normal Inspection


Throat/Mouth: Normal Inspection, Normal Lips, Normal Voice, No Airway Compromise


Head: Atraumatic, Normocephalic


Neck: Normal Inspection, Supple, Non-Tender, Full Range of Motion


Respiratory/Chest: No Respiratory Distress, Lungs Clear, Normal Breath Sounds, 

No Accessory Muscle Use, Chest Non-Tender


Cardiovascular: Normal Peripheral Pulses, Regular Rate, Rhythm, No Edema, No 

Gallop, No JVD, No Murmur, No Rub


GI/Abdominal: Normal Bowel Sounds, Soft, Non-Tender, No Organomegaly, No 

Distention, Other (right upper quadrant area of pain was nontender to palpation.

 ).  No: Guarding, Rigid, Rebound


 (Female) Exam: Deferred


Rectal (Female) Exam: Deferred


Back Exam: Full Range of Motion.  No: CVA Tenderness (L), CVA Tenderness (R), 

Vertebral Tenderness


Extremities: Normal Inspection


Neurological: Alert, Oriented, Normal Cognition, Normal Gait, No Motor/Sensory 

Deficits


Psychiatric: Normal Affect, Normal Mood


Skin Exam: Warm, Dry, Intact, Normal Color, No Rash





Course





- Vital Signs


Last Recorded V/S: 


                                Last Vital Signs











Temp  98 F   21 15:35


 


Pulse  83   21 15:35


 


Resp  16   21 15:35


 


BP  114/75   21 15:35


 


Pulse Ox      














- Orders/Labs/Meds


Orders: 


                               Active Orders 24 hr











 Category Date Time Status


 


 Peripheral IV Care [RC] .AS DIRECTED Care  21 15:55 Active


 


 Sodium Chloride 0.9% [Saline Flush] Med  21 15:53 Active





 10 ml FLUSH ASDIRECTED PRN   


 


 Peripheral IV Insertion Adult [OM.PC] Stat Oth  21 15:55 Ordered








                                Medication Orders





Sodium Chloride (Sodium Chloride 0.9% 10 Ml Syringe)  10 ml FLUSH ASDIRECTED PRN


   PRN Reason: Keep Vein Open


   Last Admin: 21 16:10  Dose: 10 ml


   Documented by: BAHMAN








Labs: 


                                Laboratory Tests











  21 Range/Units





  15:40 15:40 16:08 


 


WBC    6.0  (5.0-10.0)  10^3/uL


 


RBC    4.03 L  (4.2-5.4)  10^6/uL


 


Hgb    11.1 L  (12.0-16.0)  g/dL


 


Hct    34.7 L  (37.0-47.0)  %


 


MCV    86.1  ()  fL


 


MCH    27.5  (27.0-34.0)  pg


 


MCHC    32.0 L  (33.0-35.0)  g/dL


 


Plt Count    256  (150-450)  10^3/uL


 


Neut % (Auto)    56.7  (42.2-75.2)  %


 


Lymph % (Auto)    32.2  (20.5-50.1)  %


 


Mono % (Auto)    8.9 H  (2-8)  %


 


Eos % (Auto)    2.0  (1.0-3.0)  %


 


Baso % (Auto)    0.2  (0.0-1.0)  %


 


Sodium     (136-145)  mmol/L


 


Potassium     (3.5-5.1)  mmol/L


 


Chloride     ()  mmol/L


 


Carbon Dioxide     (21-32)  mmol/L


 


Anion Gap     (7-13)  mEq/L


 


BUN     (7-18)  mg/dL


 


Creatinine     (0.55-1.02)  mg/dL


 


Est Cr Clr Drug Dosing     mL/min


 


Estimated GFR (MDRD)     


 


BUN/Creatinine Ratio     (No establ ref range)  


 


Glucose     (70-99)  mg/dL


 


Calcium     (8.5-10.1)  mg/dL


 


Total Bilirubin     (0.2-1.0)  mg/dL


 


AST     (15-37)  U/L


 


ALT     (14-59)  U/L


 


Alkaline Phosphatase     ()  U/L


 


Total Protein     (6.4-8.2)  g/dL


 


Albumin     (3.4-5.0)  g/dL


 


Globulin     


 


Albumin/Globulin Ratio     


 


Amylase     ()  U/L


 


Lipase     ()  U/L


 


Urine Color  Yellow    (YELLOW)  


 


Urine Appearance  Slightly cloudy    (CLEAR)  


 


Urine pH  6.5    (5.0-9.0)  


 


Ur Specific Gravity  >= 1.030    (1.005-1.030)  


 


Urine Protein  Negative    (NEGATIVE)  


 


Urine Glucose (UA)  Negative    (NEGATIVE)  


 


Urine Ketones  Negative    (NEGATIVE)  


 


Urine Occult Blood  Negative    (NEGATIVE)  


 


Urine Nitrite  Negative    (NEGATIVE)  


 


Urine Bilirubin  Negative    (NEGATIVE)  


 


Urine Urobilinogen  0.2    (0.2-1.0)  mg/dL


 


Ur Leukocyte Esterase  Negative    (NEGATIVE)  


 


Urine HCG, Qual   Negative   














  21 Range/Units





  16:08 


 


WBC   (5.0-10.0)  10^3/uL


 


RBC   (4.2-5.4)  10^6/uL


 


Hgb   (12.0-16.0)  g/dL


 


Hct   (37.0-47.0)  %


 


MCV   ()  fL


 


MCH   (27.0-34.0)  pg


 


MCHC   (33.0-35.0)  g/dL


 


Plt Count   (150-450)  10^3/uL


 


Neut % (Auto)   (42.2-75.2)  %


 


Lymph % (Auto)   (20.5-50.1)  %


 


Mono % (Auto)   (2-8)  %


 


Eos % (Auto)   (1.0-3.0)  %


 


Baso % (Auto)   (0.0-1.0)  %


 


Sodium  139  (136-145)  mmol/L


 


Potassium  3.6  (3.5-5.1)  mmol/L


 


Chloride  103  ()  mmol/L


 


Carbon Dioxide  27  (21-32)  mmol/L


 


Anion Gap  12.6  (7-13)  mEq/L


 


BUN  16  (7-18)  mg/dL


 


Creatinine  0.68  (0.55-1.02)  mg/dL


 


Est Cr Clr Drug Dosing  127.57  mL/min


 


Estimated GFR (MDRD)  > 60  


 


BUN/Creatinine Ratio  23.5  (No establ ref range)  


 


Glucose  86  (70-99)  mg/dL


 


Calcium  8.4 L  (8.5-10.1)  mg/dL


 


Total Bilirubin  0.3  (0.2-1.0)  mg/dL


 


AST  9 L  (15-37)  U/L


 


ALT  22  (14-59)  U/L


 


Alkaline Phosphatase  71  ()  U/L


 


Total Protein  7.1  (6.4-8.2)  g/dL


 


Albumin  3.8  (3.4-5.0)  g/dL


 


Globulin  3.3  


 


Albumin/Globulin Ratio  1.2  


 


Amylase  28  ()  U/L


 


Lipase  75  ()  U/L


 


Urine Color   (YELLOW)  


 


Urine Appearance   (CLEAR)  


 


Urine pH   (5.0-9.0)  


 


Ur Specific Gravity   (1.005-1.030)  


 


Urine Protein   (NEGATIVE)  


 


Urine Glucose (UA)   (NEGATIVE)  


 


Urine Ketones   (NEGATIVE)  


 


Urine Occult Blood   (NEGATIVE)  


 


Urine Nitrite   (NEGATIVE)  


 


Urine Bilirubin   (NEGATIVE)  


 


Urine Urobilinogen   (0.2-1.0)  mg/dL


 


Ur Leukocyte Esterase   (NEGATIVE)  


 


Urine HCG, Qual   











Meds: 


Medications











Generic Name Dose Route Start Last Admin





  Trade Name Freq  PRN Reason Stop Dose Admin


 


Sodium Chloride  10 ml  21 15:53  21 16:10





  Sodium Chloride 0.9% 10 Ml Syringe  FLUSH   10 ml





  ASDIRECTED PRN   Administration





  Keep Vein Open  














Discontinued Medications














Generic Name Dose Route Start Last Admin





  Trade Name Freq  PRN Reason Stop Dose Admin


 


Iopamidol  100 ml  21 16:35  21 17:17





  Iopamidol 612 Mg/Ml 100 Ml Bottle  IVPUSH  21 16:36  75 ml





  ONETIME ONE   Administration














- Radiology Interpretation


Free Text/Narrative:: 


CT Abdomen an pelvis: 


PROCEDURE INFORMATION:


Exam: CT Abdomen And Pelvis With Contrast


Exam date and time: 2021 5:02 PM


Age: 31 years old


Clinical indication: Abdominal pain; Localized; Right upper quadrant (ruq); 

Additional info: Ruq pain


TECHNIQUE:


Imaging protocol: Computed tomography of the abdomen and pelvis with contrast. 

Sagittal and


coronal reformatted images were created and reviewed.


Radiation optimization: All CT scans at this facility use at least one of these 

dose optimization


techniques: automated exposure control; mA and/or kV adjustment per patient size

 (includes targeted


exams where dose is matched to clinical indication); or iterative 

reconstruction.


Contrast material: ISOVUE 300; Contrast volume: 75 ml; Contrast route: 

INTRAVENOUS (IV);


COMPARISON:


No relevant prior studies available.


FINDINGS:


Lungs: Visualized lungs are clear.


Pleural spaces: No pleural effusion.


Heart: Visualized portions of the heart are unremarkable.


Liver: The liver is unremarkable.


Gallbladder and bile ducts: The gallbladder is unremarkable. No biliary ductal 

dilatation.


Pancreas: The pancreas is unremarkable. No pancreatic ductal dilatation.


Spleen: The spleen is unremarkable.


Adrenal glands: The right and left adrenal glands are unremarkable.


Kidneys and ureters: The right kidney is unremarkable. Nonobstructing stone in 

the left kidney


measuring 2.6 mm (series 2, image 30). The right and left ureters are 

unremarkable.


Stomach and bowel: No obstruction. No mucosal thickening.


VINI GOMEZ Accession: [id number] MRN:[id number] | Final Radiology Report


CONFIDENTIALITY STATEMENT


This report is intended only for use by the referring physician, and only in 

accordance with law. If you received this in error, call [phone].


Page 2 of 2


Appendix: The appendix is visualized and is unremarkable. No findings to suggest

 acute


appendicitis.


Intraperitoneal space: No free intraperitoneal air. No ascites. No loculated 

fluid collections to


suggest an abscess.


Vasculature: No evidence for aortic aneurysm or aortic dissection. Hepatic vein

s, portal veins,


splenic vein, and SMV are patent.


Lymph nodes: No lymphadenopathy.


Urinary bladder: The bladder is incompletely filled, which can limit evaluation.

 No focal abnormality


in the bladder however.


Reproductive: There is an arcuate uterus. Dominant follicle in the right ovary 

measuring 1.3 x 1.6 cm


(series 2, image 76). Multiple subcentimeter follicles in both right and left 

ovaries.


Bones/joints: Bone islands in the right femoral head and right acetabulum.


Soft tissues: No acute abnormality in the extra-abdominal soft tissues.


IMPRESSION:


1. No acute abnormality in the abdomen or pelvis.


2. Nonobstructing left renal stone.


3. Dominant follicle in the right ovary.


4. Incidental/nonacute findings are listed in the report.


Thank you for allowing us to participate in the care of your patient.


Dictated and Authenticated by: Jennifer Michaels MD


2021 5:35 PM Central Time ([place] & [place])





- Re-Assessments/Exams


Free Text/Narrative Re-Assessment/Exam: 





21 18:01


Pt has a benign exam, no acute lab abnormalities, and an essentially normal CT 

Abd/Pelvis. There is moderately large stool burden on the image. Pt reports Hx 

of chronic constipation which has been a problem recently. Plan to d/c pt home 

with Rx for Lactulose, Bentyl, and Dulcolax. 





Departure





- Departure


Time of Disposition: 18:03


Disposition: Home, Self-Care 01


Condition: Good


Clinical Impression: 


 Abdominal pain, right upper quadrant, Chronic constipation








- Discharge Information


*PRESCRIPTION DRUG MONITORING PROGRAM REVIEWED*: Not Applicable


*COPY OF PRESCRIPTION DRUG MONITORING REPORT IN PATIENT [facility]: Not Applicable


Instructions:  Abdominal Pain, Adult, Chronic Constipation


Forms:  ED Department Discharge


Additional Instructions: 


Rx: Lactulose 10g/15ml


Rx: Dicyclomine 20mg


Rx: Dulcolax 5mg


Drink plenty of water.


High fiber diet.


Avoid cheese, rice, bread/flour products, banana, and other constipating foods.


Follow up in clinic next week for recheck. Consider an outpatient abdominal 

ultrasound if right upper abdominal pain becomes recurrent.





Sepsis Event Note (ED)





- Focused Exam


Vital Signs: 


                                   Vital Signs











  Temp Pulse Resp BP


 


 21 15:35  98 F  83  16  114/75














- My Orders


Last 24 Hours: 


My Active Orders





21 15:53


Sodium Chloride 0.9% [Saline Flush]   10 ml FLUSH ASDIRECTED PRN 





21 15:55


Peripheral IV Care [RC] .AS DIRECTED 


Peripheral IV Insertion Adult [OM.PC] Stat 














- Assessment/Plan


Last 24 Hours: 


My Active Orders





21 15:53


Sodium Chloride 0.9% [Saline Flush]   10 ml FLUSH ASDIRECTED PRN 





21 15:55


Peripheral IV Care [RC] .AS DIRECTED 


Peripheral IV Insertion Adult [OM.PC] Stat 














I have read and agree with the documentation that has been completed regarding 

this visit. By signing this record, I attest that the documentation was 

completed in my physical presence and is an accurate record of the encounter.